# Patient Record
Sex: FEMALE | Race: AMERICAN INDIAN OR ALASKA NATIVE
[De-identification: names, ages, dates, MRNs, and addresses within clinical notes are randomized per-mention and may not be internally consistent; named-entity substitution may affect disease eponyms.]

---

## 2021-07-08 ENCOUNTER — HOSPITAL ENCOUNTER (OUTPATIENT)
Dept: HOSPITAL 5 - ED | Age: 56
Setting detail: OBSERVATION
LOS: 1 days | Discharge: HOME HEALTH | End: 2021-07-09
Attending: INTERNAL MEDICINE | Admitting: INTERNAL MEDICINE
Payer: MEDICAID

## 2021-07-08 DIAGNOSIS — E78.5: ICD-10-CM

## 2021-07-08 DIAGNOSIS — Z79.82: ICD-10-CM

## 2021-07-08 DIAGNOSIS — F17.213: ICD-10-CM

## 2021-07-08 DIAGNOSIS — F10.129: ICD-10-CM

## 2021-07-08 DIAGNOSIS — Z79.899: ICD-10-CM

## 2021-07-08 DIAGNOSIS — Z91.19: ICD-10-CM

## 2021-07-08 DIAGNOSIS — J96.10: Primary | ICD-10-CM

## 2021-07-08 DIAGNOSIS — I42.9: ICD-10-CM

## 2021-07-08 DIAGNOSIS — Z98.891: ICD-10-CM

## 2021-07-08 DIAGNOSIS — J44.9: ICD-10-CM

## 2021-07-08 DIAGNOSIS — I50.23: ICD-10-CM

## 2021-07-08 DIAGNOSIS — Z98.890: ICD-10-CM

## 2021-07-08 DIAGNOSIS — I11.0: ICD-10-CM

## 2021-07-08 LAB
ALBUMIN SERPL-MCNC: 3.9 G/DL (ref 3.9–5)
ALT SERPL-CCNC: 18 UNITS/L (ref 7–56)
BASOPHILS # (AUTO): 0 K/MM3 (ref 0–0.1)
BASOPHILS NFR BLD AUTO: 0.6 % (ref 0–1.8)
BUN SERPL-MCNC: 14 MG/DL (ref 7–17)
BUN/CREAT SERPL: 14 %
CALCIUM SERPL-MCNC: 8.7 MG/DL (ref 8.4–10.2)
EOSINOPHIL # BLD AUTO: 0.1 K/MM3 (ref 0–0.4)
EOSINOPHIL NFR BLD AUTO: 1.8 % (ref 0–4.3)
HCT VFR BLD CALC: 41.2 % (ref 30.3–42.9)
HEMOLYSIS INDEX: 24
HGB BLD-MCNC: 14 GM/DL (ref 10.1–14.3)
INR PPP: 0.95 (ref 0.87–1.13)
LYMPHOCYTES # BLD AUTO: 2.3 K/MM3 (ref 1.2–5.4)
LYMPHOCYTES NFR BLD AUTO: 39.4 % (ref 13.4–35)
MCHC RBC AUTO-ENTMCNC: 34 % (ref 30–34)
MCV RBC AUTO: 96 FL (ref 79–97)
MONOCYTES # (AUTO): 0.5 K/MM3 (ref 0–0.8)
MONOCYTES % (AUTO): 8.2 % (ref 0–7.3)
PLATELET # BLD: 267 K/MM3 (ref 140–440)
RBC # BLD AUTO: 4.29 M/MM3 (ref 3.65–5.03)

## 2021-07-08 PROCEDURE — 36415 COLL VENOUS BLD VENIPUNCTURE: CPT

## 2021-07-08 PROCEDURE — 96376 TX/PRO/DX INJ SAME DRUG ADON: CPT

## 2021-07-08 PROCEDURE — 94640 AIRWAY INHALATION TREATMENT: CPT

## 2021-07-08 PROCEDURE — 99285 EMERGENCY DEPT VISIT HI MDM: CPT

## 2021-07-08 PROCEDURE — 93005 ELECTROCARDIOGRAM TRACING: CPT

## 2021-07-08 PROCEDURE — 96375 TX/PRO/DX INJ NEW DRUG ADDON: CPT

## 2021-07-08 PROCEDURE — 96374 THER/PROPH/DIAG INJ IV PUSH: CPT

## 2021-07-08 PROCEDURE — 71275 CT ANGIOGRAPHY CHEST: CPT

## 2021-07-08 PROCEDURE — 94644 CONT INHLJ TX 1ST HOUR: CPT

## 2021-07-08 PROCEDURE — 99406 BEHAV CHNG SMOKING 3-10 MIN: CPT

## 2021-07-08 PROCEDURE — 71046 X-RAY EXAM CHEST 2 VIEWS: CPT

## 2021-07-08 PROCEDURE — G0378 HOSPITAL OBSERVATION PER HR: HCPCS

## 2021-07-08 PROCEDURE — 83880 ASSAY OF NATRIURETIC PEPTIDE: CPT

## 2021-07-08 PROCEDURE — 85025 COMPLETE CBC W/AUTO DIFF WBC: CPT

## 2021-07-08 PROCEDURE — 84484 ASSAY OF TROPONIN QUANT: CPT

## 2021-07-08 PROCEDURE — 85610 PROTHROMBIN TIME: CPT

## 2021-07-08 PROCEDURE — 80048 BASIC METABOLIC PNL TOTAL CA: CPT

## 2021-07-08 PROCEDURE — 85379 FIBRIN DEGRADATION QUANT: CPT

## 2021-07-08 PROCEDURE — 80053 COMPREHEN METABOLIC PANEL: CPT

## 2021-07-08 PROCEDURE — 93306 TTE W/DOPPLER COMPLETE: CPT

## 2021-07-08 RX ADMIN — Medication SCH ML: at 22:24

## 2021-07-08 RX ADMIN — FUROSEMIDE SCH: 10 INJECTION, SOLUTION INTRAVENOUS at 17:37

## 2021-07-08 NOTE — EMERGENCY DEPARTMENT REPORT
ED Chest Pain HPI





- General


Chief Complaint: Chest Pain


Stated Complaint: CHEST PAIN/ SHORTNESS OF BREATH


Time Seen by Provider: 07/08/21 12:45


Source: patient


Mode of arrival: Wheelchair


Limitations: No Limitations





- History of Present Illness


Initial Comments: 





This is a 56-year-old female presents to the emergency department with a 

complaint of a 1 day history of some generalized chest discomfort and shortness 

of breath.  It is associated with some wheezing and a mixed dry and productive 

cough.  The patient's chest discomfort increases with her respirations.  She 

denies any lower extremity swelling, fever, back pain, abdominal pain.  She has 

a past medical history of COPD for which she is supposed to be on home O2 but 

does not have the equipment.  She has a history of CHF.  Patient says that she 

has a primary care physician, cardiologist, and pulmonologist, back in [place] 

but she recently [place] out [place] [facility].  No recent travel or sick 

contacts at home.  She is a tobacco smoker but denies any illicit drug use.





- Related Data


                                  Previous Rx's











 Medication  Instructions  Recorded  Last Taken  Type


 


ALBUTEROL NEB's [Proventil 0.083% 2.5 mg IH Q4HRT PRN #30 nebu 07/09/21 Unknown 

Rx





NEBS]    


 


Albuterol Mdi (or & Nicu Only) 2 puff IH QID PRN #8.5 gram 07/09/21 Unknown Rx





[ProAir HFA Inhaler]    


 


Aspirin EC [Halfprin EC] 81 mg PO QDAY #30 tablet. 07/09/21 Unknown Rx


 


Folic Acid [Folvite] 1 mg PO QDAY #30 tablet 07/09/21 Unknown Rx


 


Pravastatin Sodium [Pravastatin] 10 mg PO QHS #30 tablet 07/09/21 Unknown Rx


 


carvediloL [Coreg] 6.25 mg PO BID #60 tablet 07/09/21 Unknown Rx


 


levoFLOXacin [Levaquin TAB] 750 mg PO Q24HR #5 tablet 07/09/21 Unknown Rx


 


lisinopriL [Zestril TAB] 2.5 mg PO QDAY #30 tablet 07/09/21 Unknown Rx











                                    Allergies











Allergy/AdvReac Type Severity Reaction Status Date / Time


 


No Known Allergies Allergy   Unverified 07/08/21 10:37














Heart Score





- HEART Score


History: Slightly suspicious


EKG: Non-specific


Age: 45-65


Risk factors: 1-2 risk factors


Troponin: < normal limit


HEART Score: 3





- EKG Read Time


Time EKG Completed: 10:44


EKG Read Time: 10:45





ED Review of Systems


ROS: 


Stated complaint: CHEST PAIN/ SHORTNESS OF BREATH


Other details as noted in HPI





Comment: All other systems reviewed and negative


Constitutional: denies: chills, fever


Eyes: denies: eye pain, vision change


ENT: denies: ear pain, throat pain


Respiratory: cough, shortness of breath, wheezing


Cardiovascular: chest pain.  denies: edema


Gastrointestinal: denies: abdominal pain, vomiting


Genitourinary: denies: dysuria, discharge


Musculoskeletal: denies: back pain, arthralgia


Skin: denies: rash, lesions


Neurological: denies: headache, weakness





ED Past Medical Hx





- Past Medical History


Hx Congestive Heart Failure: Yes


Hx COPD: Yes


Additional medical history: RAPID HEART BEAT/ EMPHYSEMA





- Surgical History


Additional Surgical History: RIGHT BREST/ C SECTION





- Social History


Smoking Status: Current Some Day Smoker


Substance Use Type: Alcohol





- Medications


Home Medications: 


                                Home Medications











 Medication  Instructions  Recorded  Confirmed  Last Taken  Type


 


ALBUTEROL NEB's [Proventil 0.083% 2.5 mg IH Q4HRT PRN #30 nebu 07/09/21  Unknown

 Rx





NEBS]     


 


Albuterol Mdi (or & Nicu Only) 2 puff IH QID PRN #8.5 gram 07/09/21  Unknown Rx





[ProAir HFA Inhaler]     


 


Aspirin EC [Halfprin EC] 81 mg PO QDAY #30 tablet. 07/09/21  Unknown Rx


 


Folic Acid [Folvite] 1 mg PO QDAY #30 tablet 07/09/21  Unknown Rx


 


Pravastatin Sodium [Pravastatin] 10 mg PO QHS #30 tablet 07/09/21  Unknown Rx


 


carvediloL [Coreg] 6.25 mg PO BID #60 tablet 07/09/21  Unknown Rx


 


levoFLOXacin [Levaquin TAB] 750 mg PO Q24HR #5 tablet 07/09/21  Unknown Rx


 


lisinopriL [Zestril TAB] 2.5 mg PO QDAY #30 tablet 07/09/21  Unknown Rx














ED Physical Exam





- General


Limitations: No Limitations





- Other


Other exam information: 





GENERAL: The patient is well-developed well-nourished.


HENT: Normocephalic.  Atraumatic.    Patient has moist mucous membranes.


EYES: Extraocular motions are intact. 


NECK: Supple. Trachea is midline.


CHEST/LUNGS: Coarse breath sounds throughout the chest.  A productive sounding 

cough is heard.  Tachypnea but no accessory muscle use.  Chest pain is not 

reproducible to palpation of the chest wall, no crepitus or deformity. 


HEART/CARDIOVASCULAR: Regular.  There is no tachycardia.  There is no murmur.


ABDOMEN: Abdomen is soft, nontender.  Patient has normal bowel sounds.  There is

no abdominal distention.


SKIN: Skin is warm and dry. 


NEURO: The patient is awake, alert, and oriented.  The patient is cooperative.  

The patient has no focal neurologic deficits.  Normal speech.


MUSCULOSKELETAL: There is no tenderness or deformity.  There is no limitation 

range of motion. 





ED Course


                                   Vital Signs











  07/08/21 07/08/21 07/08/21





  10:49 12:57 13:00


 


Temperature 98.1 F  


 


Pulse Rate 86  82


 


Respiratory 20  16





Rate   


 


Blood Pressure 136/63  136/77


 


O2 Sat by Pulse 95 98 97





Oximetry   














  07/08/21 07/08/21 07/08/21





  13:16 13:30 13:46


 


Temperature   


 


Pulse Rate 100 H 82 82


 


Respiratory 16 16 13





Rate   


 


Blood Pressure 129/72 108/62 131/79


 


O2 Sat by Pulse 95 97 97





Oximetry   














  07/08/21 07/08/21 07/08/21





  14:00 14:16 14:30


 


Temperature   


 


Pulse Rate 89 102 H 89


 


Respiratory 14 18 16





Rate   


 


Blood Pressure 128/80 126/82 124/81


 


O2 Sat by Pulse 100 98 97





Oximetry   














  07/08/21 07/08/21 07/08/21





  14:46 15:00 15:16


 


Temperature   


 


Pulse Rate 94 H 96 H 65


 


Respiratory 18 14 19





Rate   


 


Blood Pressure 127/84 135/92 139/92


 


O2 Sat by Pulse 97 98 95





Oximetry   














  07/08/21 07/08/21 07/08/21





  15:30 16:18 16:30


 


Temperature   


 


Pulse Rate 101 H  105 H


 


Respiratory 24  18





Rate   


 


Blood Pressure 135/92 135/92 135/92


 


O2 Sat by Pulse 96 97 98





Oximetry   














  07/08/21 07/08/21 07/08/21





  16:46 18:35 18:46


 


Temperature   


 


Pulse Rate 105 H  94 H


 


Respiratory 18 21 17





Rate   


 


Blood Pressure 140/94 138/89 131/82


 


O2 Sat by Pulse 94 99 98





Oximetry   














  07/08/21 07/08/21 07/08/21





  19:00 19:16 19:30


 


Temperature   


 


Pulse Rate 85 89 80


 


Respiratory 16 14 16





Rate   


 


Blood Pressure 145/80 142/78 137/83


 


O2 Sat by Pulse 98 97 99





Oximetry   














  07/08/21 07/08/21 07/08/21





  19:45 19:46 20:00


 


Temperature   


 


Pulse Rate  105 H 105 H


 


Respiratory 20 21 20





Rate   


 


Blood Pressure  152/90 152/90


 


O2 Sat by Pulse 97 98 86





Oximetry   














  07/08/21 07/08/21 07/08/21





  20:16 20:30 20:46


 


Temperature   


 


Pulse Rate 102 H 100 H 105 H


 


Respiratory 18 16 23





Rate   


 


Blood Pressure 164/92 169/117 169/97


 


O2 Sat by Pulse 98 97 98





Oximetry   














  07/08/21 07/08/21





  21:00 21:16


 


Temperature  


 


Pulse Rate 107 H 78


 


Respiratory 17 25 H





Rate  


 


Blood Pressure 152/89 147/95


 


O2 Sat by Pulse 96 99





Oximetry  














RICARDO score





- Ricardo Score


Age > 65: (0) No


Aspirin use within the Past 7 Days: (0) No


3 or more CAD Risk Factors: (0) No


2 or more Angina events in past 24 hrs: (1) Yes


Known CAD with more than 50% Stenosis: (0) No


Elevated Cardiac Markers: (0) No


ST Deviation Greater than 0.5mm: (0) No


RICARDO Score: 1





ED Medical Decision Making





- Lab Data


Result diagrams: 


                                 07/08/21 12:33





                                 07/09/21 07:59








                                   Lab Results











  07/08/21 07/08/21 07/08/21 Range/Units





  12:33 12:33 13:00 


 


WBC  5.9    (4.5-11.0)  K/mm3


 


RBC  4.29    (3.65-5.03)  M/mm3


 


Hgb  14.0    (10.1-14.3)  gm/dl


 


Hct  41.2    (30.3-42.9)  %


 


MCV  96    (79-97)  fl


 


MCH  33 H    (28-32)  pg


 


MCHC  34    (30-34)  %


 


RDW  13.1 L    (13.2-15.2)  %


 


Plt Count  267    (140-440)  K/mm3


 


Lymph % (Auto)  39.4 H    (13.4-35.0)  %


 


Mono % (Auto)  8.2 H    (0.0-7.3)  %


 


Eos % (Auto)  1.8    (0.0-4.3)  %


 


Baso % (Auto)  0.6    (0.0-1.8)  %


 


Lymph # (Auto)  2.3    (1.2-5.4)  K/mm3


 


Mono # (Auto)  0.5    (0.0-0.8)  K/mm3


 


Eos # (Auto)  0.1    (0.0-0.4)  K/mm3


 


Baso # (Auto)  0.0    (0.0-0.1)  K/mm3


 


Seg Neutrophils %  50.0    (40.0-70.0)  %


 


Seg Neutrophils #  2.9    (1.8-7.7)  K/mm3


 


PT     (12.2-14.9)  Sec.


 


INR     (0.87-1.13)  


 


D-Dimer     (0-234)  ng/mlDDU


 


Sodium   139   (137-145)  mmol/L


 


Potassium   3.8   (3.6-5.0)  mmol/L


 


Chloride   102.1   ()  mmol/L


 


Carbon Dioxide   26   (22-30)  mmol/L


 


Anion Gap   15   mmol/L


 


BUN   14   (7-17)  mg/dL


 


Creatinine   1.0   (0.6-1.2)  mg/dL


 


Estimated GFR   57   ml/min


 


BUN/Creatinine Ratio   14   %


 


Glucose   90   ()  mg/dL


 


Calcium   8.7   (8.4-10.2)  mg/dL


 


Total Bilirubin   0.30   (0.1-1.2)  mg/dL


 


AST   24   (5-40)  units/L


 


ALT   18   (7-56)  units/L


 


Alkaline Phosphatase   77   ()  units/L


 


Troponin T   < 0.010  < 0.010  (0.00-0.029)  ng/mL


 


NT-Pro-B Natriuret Pep     (0-900)  pg/mL


 


Total Protein   6.9   (6.3-8.2)  g/dL


 


Albumin   3.9   (3.9-5)  g/dL


 


Albumin/Globulin Ratio   1.3   %














  07/08/21 07/08/21 07/08/21 Range/Units





  13:00 13:00 13:00 


 


WBC     (4.5-11.0)  K/mm3


 


RBC     (3.65-5.03)  M/mm3


 


Hgb     (10.1-14.3)  gm/dl


 


Hct     (30.3-42.9)  %


 


MCV     (79-97)  fl


 


MCH     (28-32)  pg


 


MCHC     (30-34)  %


 


RDW     (13.2-15.2)  %


 


Plt Count     (140-440)  K/mm3


 


Lymph % (Auto)     (13.4-35.0)  %


 


Mono % (Auto)     (0.0-7.3)  %


 


Eos % (Auto)     (0.0-4.3)  %


 


Baso % (Auto)     (0.0-1.8)  %


 


Lymph # (Auto)     (1.2-5.4)  K/mm3


 


Mono # (Auto)     (0.0-0.8)  K/mm3


 


Eos # (Auto)     (0.0-0.4)  K/mm3


 


Baso # (Auto)     (0.0-0.1)  K/mm3


 


Seg Neutrophils %     (40.0-70.0)  %


 


Seg Neutrophils #     (1.8-7.7)  K/mm3


 


PT  13.3    (12.2-14.9)  Sec.


 


INR  0.95    (0.87-1.13)  


 


D-Dimer    256.97 H  (0-234)  ng/mlDDU


 


Sodium     (137-145)  mmol/L


 


Potassium     (3.6-5.0)  mmol/L


 


Chloride     ()  mmol/L


 


Carbon Dioxide     (22-30)  mmol/L


 


Anion Gap     mmol/L


 


BUN     (7-17)  mg/dL


 


Creatinine     (0.6-1.2)  mg/dL


 


Estimated GFR     ml/min


 


BUN/Creatinine Ratio     %


 


Glucose     ()  mg/dL


 


Calcium     (8.4-10.2)  mg/dL


 


Total Bilirubin     (0.1-1.2)  mg/dL


 


AST     (5-40)  units/L


 


ALT     (7-56)  units/L


 


Alkaline Phosphatase     ()  units/L


 


Troponin T     (0.00-0.029)  ng/mL


 


NT-Pro-B Natriuret Pep   3162 H   (0-900)  pg/mL


 


Total Protein     (6.3-8.2)  g/dL


 


Albumin     (3.9-5)  g/dL


 


Albumin/Globulin Ratio     %














  07/08/21 Range/Units





  16:40 


 


WBC   (4.5-11.0)  K/mm3


 


RBC   (3.65-5.03)  M/mm3


 


Hgb   (10.1-14.3)  gm/dl


 


Hct   (30.3-42.9)  %


 


MCV   (79-97)  fl


 


MCH   (28-32)  pg


 


MCHC   (30-34)  %


 


RDW   (13.2-15.2)  %


 


Plt Count   (140-440)  K/mm3


 


Lymph % (Auto)   (13.4-35.0)  %


 


Mono % (Auto)   (0.0-7.3)  %


 


Eos % (Auto)   (0.0-4.3)  %


 


Baso % (Auto)   (0.0-1.8)  %


 


Lymph # (Auto)   (1.2-5.4)  K/mm3


 


Mono # (Auto)   (0.0-0.8)  K/mm3


 


Eos # (Auto)   (0.0-0.4)  K/mm3


 


Baso # (Auto)   (0.0-0.1)  K/mm3


 


Seg Neutrophils %   (40.0-70.0)  %


 


Seg Neutrophils #   (1.8-7.7)  K/mm3


 


PT   (12.2-14.9)  Sec.


 


INR   (0.87-1.13)  


 


D-Dimer   (0-234)  ng/mlDDU


 


Sodium   (137-145)  mmol/L


 


Potassium   (3.6-5.0)  mmol/L


 


Chloride   ()  mmol/L


 


Carbon Dioxide   (22-30)  mmol/L


 


Anion Gap   mmol/L


 


BUN   (7-17)  mg/dL


 


Creatinine   (0.6-1.2)  mg/dL


 


Estimated GFR   ml/min


 


BUN/Creatinine Ratio   %


 


Glucose   ()  mg/dL


 


Calcium   (8.4-10.2)  mg/dL


 


Total Bilirubin   (0.1-1.2)  mg/dL


 


AST   (5-40)  units/L


 


ALT   (7-56)  units/L


 


Alkaline Phosphatase   ()  units/L


 


Troponin T  < 0.010  (0.00-0.029)  ng/mL


 


NT-Pro-B Natriuret Pep   (0-900)  pg/mL


 


Total Protein   (6.3-8.2)  g/dL


 


Albumin   (3.9-5)  g/dL


 


Albumin/Globulin Ratio   %














- EKG Data


-: EKG Interpreted by Me


EKG shows normal: sinus rhythm, axis, intervals, QRS complexes (LVH), ST-T waves

(Nonspecific ST T waves)


Rate: normal





- EKG Data


When compared to previous EKG there are: previous EKG unavailable


Interpretation: other (Sinus rhythm, normal axis, normal intervals, LVH, 

nonspecific ST T waves.  No ST elevation MI.)





- Radiology Data


Radiology results: report reviewed





CHEST 2 VIEWS 1101 INDICATION / CLINICAL INFORMATION: CP/SOB COMPARISON: None 

available. FINDINGS: SUPPORT DEVICES: None. HEART / MEDIASTINUM: Mild 

cardiomegaly LUNGS / PLEURA: Pulmonary vascularity appears within normal limits.

No definite pleural effusions are seen. Diffuse bilateral moderately prominent 

increased interstitial markings are seen. These are noted throughout both lung 

fields though are most noticeable in the right base and particularly the right 

middle lobe. Probably these markings are predominantly chronic based on 

appearance though certainly could include interstitial edema/infiltrate and I am

concerned there could be superimposed focal pneumonitis in the right middle lobe

though without dense consolidation. No pneumothorax. ADDITIONAL FINDINGS: No 

significant additional findings. IMPRESSION: Diffuse interstitial markings as 

above, more concentrated in the right middle lobe as discussed. Chronicity is 

unclear but acute pneumonitis as well as mild edema are possible. In the absence

of prior studies for comparison, follow-up is suggested as is clinical 

correlation. 





CTA CHEST WITH CONTRAST INDICATION / CLINICAL INFORMATION: CP, SOB, Elevated 

dimer 100 ml omni 350. TECHNIQUE: Axial CT images were obtained through the 

chest after injection of 100 cc of Omnipaque 350 IV contrast. 3 plane MIP and/or

3D reconstructions were produced. All CT scans at this location are performed 

using CT dose reduction for ALARA by means of automated exposure control. COM

PARISON: None available. FINDINGS: PULMONARY ARTERIES: No pulmonary emboli. 

THORACIC AORTA: Mild atherosclerotic calcification without acute abnormality. 

HEART: Mildly enlarged CORONARY ARTERY CALCIFICATION: None. MEDIASTINUM / KAIA: 

No significant abnormality. PLEURA: There is a small right pleural effusion No 

pneumothorax. LUNGS: There are scattered bullous disease. There is thickening of

interlobular septa. There is peripheral interstitial disease bilaterally. There 

are some very small nodules in the periphery of the upper lobes measuring up to 

4 mm. ADDITIONAL FINDINGS: None. UPPER ABDOMEN: There are small hypodensities in

the liver characteristic of cysts. SKELETAL STRUCTURES: No significant osseous 

abnormality. IMPRESSION: 1. No CT evidence for pulmonary embolism. 2. There are 

bilateral interstitial opacities may be chronic or may represent edema. The 

possibility of an interstitial pneumonitis as well as infectious process is 

considered in the differential. There are multiple very small nodular densities 

in the periphery of the lungs measuring up to 4 mm. 





- Medical Decision Making





This patient presents to the emergency department with complaint of some 

generalized chest pain that worsens with deep respirations.  She has both 

wheezing and coarse breath sounds heard.  There is some mild tachypnea but no 

accessory muscle use.





EKG does not have any morphology consistent with ST elevation myocardial 

infarction.





Chest x-ray shows some interstitial markings that appear, to me, to be 

consistent with some interstitial edema.  No widened mediastinum.  No obvious 

pneumonia.





Patient was given breathing treatments, Solu-Medrol, Lasix.





Patient's labs were concerning for an elevated proBNP of about 3000, and a 

slightly elevated and equivocal D-dimer level.  The patient had a CT angiography

of the chest that once again showed these interstitial markings that could be 

edema versus some pneumonitis.  Patient continues to have the symptoms despite 

the aforementioned treatment.  She has a heart score of 3.  Patient will be 

admitted to the hospital for further evaluation and treatment was accepted for 

admission by the hospitalist, Dr. Rowley.


Critical Care Time: No


Critical care attestation.: 


If time is entered above; I have spent that time in minutes in the direct care 

of this critically ill patient, excluding procedure time.








ED Disposition


Clinical Impression: 


 Acute chest pain, Tobacco use disorder





CHF exacerbation


Qualifiers:


 Heart failure type: systolic Qualified Code(s): I50.23 - Acute on chronic 

systolic (congestive) heart failure





COPD (chronic obstructive pulmonary disease)


Qualifiers:


 COPD type: chronic bronchitis Chronic bronchitis type: unspecified Qualified 

Code(s): J42 - Unspecified chronic bronchitis





Disposition: DC-09 OP ADMIT IP TO THIS HOSP


Is pt being admited?: Yes


Condition: Fair


Time of Disposition: 16:58

## 2021-07-08 NOTE — XRAY REPORT
CHEST 2 VIEWS 1101



INDICATION / CLINICAL INFORMATION: CP/SOB



COMPARISON: None available.



FINDINGS:



SUPPORT DEVICES: None.



HEART / MEDIASTINUM: Mild cardiomegaly 



LUNGS / PLEURA: Pulmonary vascularity appears within normal limits. No definite pleural effusions are
 seen. Diffuse bilateral moderately prominent increased interstitial markings are seen. These are not
ed throughout both lung fields though are most noticeable in the right base and particularly the righ
t middle lobe. Probably these markings are predominantly chronic based on appearance though certainly
 could include interstitial edema/infiltrate and I am concerned there could be superimposed focal pne
umonitis in the right middle lobe though without dense consolidation. No pneumothorax. 



ADDITIONAL FINDINGS: No significant additional findings.



IMPRESSION: Diffuse interstitial markings as above, more concentrated in the right middle lobe as dis
cussed. Chronicity is unclear but acute pneumonitis as well as mild edema are possible. In the absenc
e of prior studies for comparison, follow-up is suggested as is clinical correlation.



Signer Name: Fabian Merritt MD 

Signed: 7/8/2021 11:53 AM

Workstation Name: [id number]

## 2021-07-08 NOTE — HISTORY AND PHYSICAL REPORT
History of Present Illness


Chief complaint: 





I cant breathe


History of present illness: 


55 YO Female with Nicotine Dependence, CHF, Medication Noncompliance, COPD, ETOH

Dependence, Chronic Respiratory Failure noncompliant with home oxygen presents 

to ED for evaluation.  Patient reports "I cannot breathe".  Patient states that 

she has experienced breath, chest discomfort, orthopnea, paroxysmal nocturnal 

dyspnea, decreased exercise tolerance, dyspnea on exertion, and dyspnea at rest,

chest discomfort over the past 1 week with worsening symptoms over the past 1 

day.  Patient transported to [facility] via private vehicle for further care and 

evaluation of the aforementioned symptoms.  The patient was seen and evaluated 

in the emergency department.  All lab and imaging studies reviewed.  Patient 

with chest x-ray and found to have pulmonary vascular congestion as well as 

clinical symptoms consistent with CHF decompensation.  Patient placed in 

observation status and admitted to telemetry and initiated on CHF protocol.  

Patient denies fever, chills, chest pain, palpitation, productive cough, skin 

rash, recent ill contacts, known exposure to COVID-19.  No prior admission for 

review.  All medication listed at time of admission has been reconciled.  

Cardiology team consulted in ED.





Past History


Past Medical History: heart failure, hypertension, other (See HPI)


Past Surgical History: , Other (Right breast surgery)


Social history: single, smoking.  denies: alcohol abuse, prescription drug abuse


Family history: diabetes, hypertension





Medications and Allergies


                                    Allergies











Allergy/AdvReac Type Severity Reaction Status Date / Time


 


No Known Allergies Allergy   Unverified 21 10:37














Review of Systems


Constitutional: no weight loss, no weight gain, no fever, no chills


Ears, nose, mouth and throat: no ear pain, no ear discharge, no tinnitis, no 

nose pain, no nasal congestion, no nasal discharge


Breasts: no change in shape, no swelling, no mass


Cardiovascular: orthopnea, edema, shortness of breath, dyspnea on exertion, paro

xysmal nocturnal dyspnea, leg edema, decreased exercise tolerance, no chest 

pain, no palpitations, no rapid/irregular heart beat


Respiratory: no cough, no cough with sputum, no hemoptysis


Gastrointestinal: no abdominal pain, no nausea, no vomiting, no diarrhea


Genitourinary Female: no pelvic pain, no flank pain, no dysuria, no urinary 

frequency, no urgency


Rectal: no pain, no incontinence, no bleeding


Musculoskeletal: no neck stiffness, no neck pain, no shooting arm pain, no arm 

numbness/tingling, no low back pain


Integumentary: no rash, no pruritis, no redness, no sores, no wounds


Neurological: no transient paralysis, no paralysis, no weakness, no parathesias,

no numbness, no tingling, no seizures


Psychiatric: no anxiety, no memory loss, no change in sleep habits, no sleep 

disturbances, no insomnia, no hypersomnia, no change in appetite, no change in 

libido


Endocrine: no cold intolerance, no heat intolerance, no polyphagia, no 

polydipsia, no polyuria, no nocturia, no excessive sweating


Hematologic/Lymphatic: no easy bruising, no easy bleeding, no lymphadenopathy





Exam





- Constitutional


Vitals: 


                                        











Temp Pulse Resp BP Pulse Ox


 


 98.1 F   82   13   131/79   97 


 


 21 10:49  21 13:46  21 13:46  21 13:46  21 13:46











General appearance: Present: mild distress





- EENT


Eyes: Present: PERRL


ENT: hearing intact, clear oral mucosa





- Neck


Neck: Present: supple, normal ROM





- Respiratory


Respiratory effort: normal


Respiratory: bilateral: diminished, rales





- Cardiovascular


Heart Sounds: Present: S1 & S2.  Absent: rub, click





- Extremities


Extremities: pulses symmetrical


Extremity abnormal: edema


Peripheral Pulses: within normal limits





- Abdominal


General gastrointestinal: Present: soft, non-tender, non-distended, normal bowel

sounds


Female genitourinary: Present: normal





- Integumentary


Integumentary: Present: clear, warm, dry





- Musculoskeletal


Musculoskeletal: gait normal, strength equal bilaterally





- Psychiatric


Psychiatric: appropriate mood/affect, intact judgment & insight





- Neurologic


Neurologic: CNII-XII intact, moves all extremities





HEART Score





- HEART Score


EKG: Non-specific


Age: 45-65


Risk factors: 1-2 risk factors


Troponin: 


                                        











Troponin T  < 0.010 ng/mL (0.00-0.029)   21  13:00    











Troponin: < normal limit





Results





- Labs


CBC & Chem 7: 


                                 21 12:33





                                 21 12:33


Labs: 


                              Abnormal lab results











  21 Range/Units





  12:33 13:00 13:00 


 


MCH  33 H    (28-32)  pg


 


RDW  13.1 L    (13.2-15.2)  %


 


Lymph % (Auto)  39.4 H    (13.4-35.0)  %


 


Mono % (Auto)  8.2 H    (0.0-7.3)  %


 


D-Dimer    256.97 H  (0-234)  ng/mlDDU


 


NT-Pro-B Natriuret Pep   3162 H   (0-900)  pg/mL














Assessment and Plan





- Patient Problems


(1) CHF exacerbation


Current Visit: Yes   Status: Acute   


Qualifiers: 


   Heart failure type: systolic   Qualified Code(s): I50.23 - Acute on chronic 

systolic (congestive) heart failure   


Plan to address problem: 


CHF protocol: Strict I/O, monitor urine output every shift, daily weight, aft

erload reduction, blood pressure control, echocardiogram ordered and pending at 

time of admission, cardiology team consulted.








(2) Chronic respiratory failure


Current Visit: Yes   Status: Acute   


Plan to address problem: 


Patient noncompliant with home oxygen.  Home oxygen evaluation, supportive care.








(3) Nicotine dependence


Current Visit: Yes   Status: Acute   


Qualifiers: 


   Nicotine product type: cigarettes   Substance use status: in withdrawal   

Qualified Code(s): F17.213 - Nicotine dependence, cigarettes, with withdrawal   


Plan to address problem: 


 smoking cessation counseling, supportive care, behavior change counseling, +15 

minutes








(4) Noncompliance


Current Visit: Yes   Status: Acute   


Plan to address problem: 


Patient counseled regarding medication noncompliance as well as noncompliance 

with home oxygen.  Patient knowledges understanding instructions and 

acknowledges that she will attempt to be more compliant in the future








(5) Alcohol dependence


Current Visit: Yes   Status: Acute   


Plan to address problem: 


Thiamine, folic acid, multivitamin daily, [facility] protocol.








(6) COPD (chronic obstructive pulmonary disease)


Current Visit: Yes   Status: Acute   


Qualifiers: 


   COPD type: chronic bronchitis 


Plan to address problem: 


Supplemental oxygen, pulse oximetry, nebulizer therapy, supportive care.








(7) DVT prophylaxis


Current Visit: Yes   Status: Acute   


Plan to address problem: 


SCD to bilateral lower extremities while in bed, patient is ambulatory.

## 2021-07-08 NOTE — CAT SCAN REPORT
CTA CHEST WITH CONTRAST



INDICATION / CLINICAL INFORMATION: CP, SOB, Elevated dimer  100 ml omni 350.



TECHNIQUE: Axial CT images were obtained through the chest after injection of 100 cc of Omnipaque 350
 IV contrast. 3 plane MIP and/or 3D reconstructions were produced. All CT scans at this location are 
performed using CT dose reduction for ALARA by means of automated exposure control. 



COMPARISON: None available.



FINDINGS:

PULMONARY ARTERIES: No pulmonary emboli.

THORACIC AORTA: Mild atherosclerotic calcification without acute abnormality. 

HEART: Mildly enlarged

CORONARY ARTERY CALCIFICATION: None.

MEDIASTINUM / KAIA: No significant abnormality.

PLEURA: There is a small right pleural effusion No pneumothorax.

LUNGS: There are scattered bullous disease. There is thickening of interlobular septa. There is perip
heral interstitial disease bilaterally. There are some very small nodules in the periphery of the upp
er lobes measuring up to 4 mm.



ADDITIONAL FINDINGS: None.



UPPER ABDOMEN: There are small hypodensities in the liver characteristic of cysts.



SKELETAL STRUCTURES: No significant osseous abnormality.



IMPRESSION:

1. No CT evidence for pulmonary embolism. 

2. There are bilateral interstitial opacities may be chronic or may represent edema. The possibility 
of an interstitial pneumonitis as well as infectious process is considered in the differential. There
 are multiple very small nodular densities in the periphery of the lungs measuring up to 4 mm.



Multiple incidental pulmonary nodule(s) in the upper lobes measuring 4 mm with solid characteristics.
 Recommendation according to Fleischner Society 2017 Guidelines: Low Risk Patient: No routine follow-
up; High Risk Patient: Optional CT at 12 months.



Signer Name: Devan Owens MD 

Signed: 7/8/2021 4:32 PM

Workstation Name: [employer]

## 2021-07-09 VITALS — SYSTOLIC BLOOD PRESSURE: 129 MMHG | DIASTOLIC BLOOD PRESSURE: 76 MMHG

## 2021-07-09 LAB
BUN SERPL-MCNC: 17 MG/DL (ref 7–17)
BUN/CREAT SERPL: 21 %
CALCIUM SERPL-MCNC: 9.7 MG/DL (ref 8.4–10.2)
HEMOLYSIS INDEX: 0

## 2021-07-09 RX ADMIN — FUROSEMIDE SCH MG: 10 INJECTION, SOLUTION INTRAVENOUS at 07:09

## 2021-07-09 RX ADMIN — ALBUTEROL SULFATE PRN MG: 2.5 SOLUTION RESPIRATORY (INHALATION) at 05:28

## 2021-07-09 RX ADMIN — Medication SCH ML: at 09:58

## 2021-07-09 RX ADMIN — ALBUTEROL SULFATE PRN MG: 2.5 SOLUTION RESPIRATORY (INHALATION) at 14:15

## 2021-07-09 NOTE — CONSULTATION
History of Present Illness


Consult date: 21


Requesting physician: SANDHYA VILLARREAL


Consult reason: congestive heart failure


History of present illness: 





This patient is a 56-year-old female with a significant history of 

cardiomyopathy, heart failure reduced ejection fraction, palpitations, 

hypertension, hyperlipidemia, COPD emphysema, tobacco use, heart murmur.  She is

previously unknown to our practice and has been followed by cardiology through 

[facility] [address] [facility].  Patient presents to [facility] ER 

with chief complaint of shortness of breath, chest pain x3 days.  Patient 

normally has no limitations on ADLs but over the last 3 days is having 

significant exertional dyspnea.  Patient has previously been prescribed LifeVest

approximately 5 years ago which she wore for 1 year before discontinuing AGAINST

MEDICAL ADVICE.  Patient has not followed up with recommended electrophysiology 

to discuss ICD.  She had cardiac stress test 3 years prior which she reports as 

normal.  She is on home medications Coreg, flecainide, lisinopril and until 

recently hydrochlorothiazide which was discontinued due to hypokalemia.  Tobacco

use is reported with his 2 packs/day x 40 years for estimated 80 pack years, 

EtOH 2 drinks per night, no recreational drugs.  At time of interview patient is

having mild chest discomfort 3 out of 10 achy with mild shortness of breath.  

She denies any weakness, dizziness, abdominal pain, N/V/D, recent illness or 

known exposures.  Patient denies any previous history of coronary artery 

disease, CVA, DVT, PE.  In the past she has been on home O2 for COPD but has 

been discontinued for several years due to economic barriers.  Patient also 

states she has recently moved from the [place] side of [place] to this area and 

would like to be reestablished with cardiology.








Past History


Past Medical History: heart failure, hypertension, other (See HPI)


Past Surgical History: , Other (Right breast surgery)


Social history: single, smoking.  denies: alcohol abuse, prescription drug abuse


Family history: diabetes, hypertension





Medications and Allergies


                                    Allergies











Allergy/AdvReac Type Severity Reaction Status Date / Time


 


No Known Allergies Allergy   Unverified 21 10:37











Active Meds: 


Active Medications





Acetaminophen (Acetaminophen 325 Mg Tab)  650 mg PO Q4H PRN


   PRN Reason: Pain MILD(1-3)/Fever >100.5/HA


Albuterol (Albuterol 2.5 Mg/3 Ml Nebu)  2.5 mg IH Q4HRT PRN


   PRN Reason: Shortness Of Breath


   Last Admin: 21 05:28 Dose:  2.5 mg


   Documented by: 


Folic Acid (Folic Acid 1 Mg Tab)  1 mg PO QDAY [facility]


   Last Admin: 21 09:58 Dose:  1 mg


   Documented by: 


Furosemide (Furosemide 20 Mg/2 Ml Inj)  20 mg IV BID@0600,1800 [facility]


   Last Admin: 21 07:09 Dose:  20 mg


   Documented by: 


Hydromorphone HCl (Hydromorphone 1 Mg/1 Ml Inj)  0.5 mg IV Q12H PRN


   PRN Reason: Pain , Severe (7-10)


Lorazepam (Lorazepam 2 Mg/Ml Vial)  2 mg IV Q1HR PRN


   PRN Reason: CIWA-Ar 8-15


Multivitamins (Multivitamins ,Therapeutic Tab)  1 each PO QDAY [facility]


   Last Admin: 21 09:58 Dose:  1 each


   Documented by: 


Ondansetron HCl (Ondansetron 4 Mg/2 Ml Inj)  4 mg IV Q8H PRN


   PRN Reason: Nausea And Vomiting


Oxycodone/Acetaminophen (Oxycodone /Acetaminophen 5-325mg Tab)  1 tab PO Q8H PRN


   PRN Reason: Pain, Moderate (4-6)


   Last Admin: 21 07:09 Dose:  1 tab


   Documented by: 


Sodium Chloride (Sodium Chloride 0.9% 10 Ml Flush Syringe)  10 ml IV BID [facility]


   Last Admin: 21 09:58 Dose:  10 ml


   Documented by: 


Sodium Chloride (Sodium Chloride 0.9% 10 Ml Flush Syringe)  10 ml IV PRN PRN


   PRN Reason: LINE FLUSH











Review of Systems


Constitutional: no weight loss, no weight gain, no fever, no chills, no sweats, 

no night sweats


Ears, nose, mouth and throat: no ear pain, no ear discharge, no nose pain, no 

nasal congestion, no nasal discharge


Cardiovascular: chest pain, shortness of breath, dyspnea on exertion, decreased 

exercise tolerance, no orthopnea, no palpitations, no rapid/irregular heart 

beat, no edema, no syncope, no lightheadedness, no paroxysmal nocturnal dyspnea,

no claudication, no high blood pressure, no leg edema


Respiratory: cough, dyspnea on exertion, no cough with sputum, no hemoptysis, no

shortness of breath


Gastrointestinal: no abdominal pain, no nausea, no vomiting, no diarrhea


Genitourinary Female: no flank pain


Musculoskeletal: no neck stiffness, no neck pain, no shooting arm pain, no arm 

numbness/tingling, no low back pain, no shooting leg pain


Integumentary: no rash, no pruritis, no redness, no sores, no wounds, no 

jaundice


Neurological: no head injury, no paralysis, no weakness, no parathesias, no 

numbness, no tingling, no seizures, no syncope


Psychiatric: no anxiety


Endocrine: no cold intolerance, no heat intolerance


Hematologic/Lymphatic: no easy bruising, no easy bleeding


Allergic/Immunologic: no urticaria





Physical Examination


                                        


                                Last Vital Signs











Temp  97.8 F   21 08:21


 


Pulse  89   21 08:21


 


Resp  18   21 08:21


 


BP  130/76   21 08:21


 


Pulse Ox  94   21 08:21














General appearance: no acute distress


HEENT: Positive: PERRL, Normocephaly, Mucus Membranes Moist


Neck: Positive: neck supple, trachea midline


Cardiac: Positive: Reg Rate and Rhythm, S1/S2


Lungs: Positive: Decreased Breath Sounds


Neuro: Positive: Grossly Intact


Abdomen: Positive: Unremarkable, Soft


Skin: Negative: Rash, Wound


Extremities: Present: upper extr. pulses, lower extr. pulses.  Absent: edema





Results





                                 21 12:33





                                 21 07:59


                                 Cardiac Enzymes











  21 Range/Units





  12:33 


 


AST  24  (5-40)  units/L








                                   Coagulation











  21 Range/Units





  13:00 


 


PT  13.3  (12.2-14.9)  Sec.


 


INR  0.95  (0.87-1.13)  








                                       CBC











  21 Range/Units





  12:33 


 


WBC  5.9  (4.5-11.0)  K/mm3


 


RBC  4.29  (3.65-5.03)  M/mm3


 


Hgb  14.0  (10.1-14.3)  gm/dl


 


Hct  41.2  (30.3-42.9)  %


 


Plt Count  267  (140-440)  K/mm3


 


Lymph # (Auto)  2.3  (1.2-5.4)  K/mm3


 


Mono # (Auto)  0.5  (0.0-0.8)  K/mm3


 


Eos # (Auto)  0.1  (0.0-0.4)  K/mm3


 


Baso # (Auto)  0.0  (0.0-0.1)  K/mm3








                          Comprehensive Metabolic Panel











  21 Range/Units





  12:33 07:59 


 


Sodium  139  142  (137-145)  mmol/L


 


Potassium  3.8  4.3  (3.6-5.0)  mmol/L


 


Chloride  102.1  101.4  ()  mmol/L


 


Carbon Dioxide  26  27  (22-30)  mmol/L


 


BUN  14  17  (7-17)  mg/dL


 


Creatinine  1.0  0.8  (0.6-1.2)  mg/dL


 


Glucose  90  115 H  ()  mg/dL


 


Calcium  8.7  9.7  (8.4-10.2)  mg/dL


 


AST  24   (5-40)  units/L


 


ALT  18   (7-56)  units/L


 


Alkaline Phosphatase  77   ()  units/L


 


Total Protein  6.9   (6.3-8.2)  g/dL


 


Albumin  3.9   (3.9-5)  g/dL














- Imaging and Cardiology


Echo: report reviewed (Echocardiogram (2021): LVEF is 30 to 35%.  LV mildly 

dilated.  LV SF moderately decreased.  Moderate global hypokinesis of LV.  

Severe diastolic dysfunction restrictive filling.  RV SF is normal.  LA is 

mildly dilated.  Mild MR.  Mild TR.)


EKG: report reviewed (Twelve-lead reviewed shows sinus rhythm with no acute 

ischemic changes), image reviewed





EKG interpretations





- Telemetry


EKG Rhythm: Sinus Rhythm





- EKG


Sinus rhythms and dysrhythmias: sinus rhythm





Assessment and Plan





Acute respiratory distress secondary to pneumonia


* CXR reviewed: Suspicious for right middle lobe pneumonia





Chest pain


* Chest pain is currently improved.  Patient describes mild discomfort with some

  shortness of breath.  Twelve-lead shows sinus rhythm with no acute ischemic c

  hanges.  Troponins are negative x2.  We will continue to trend CE's.


* Patient reports negative stress test 3 years prior through [facility].  Will request records.





Heart failure reduced ejection fraction in setting of restrictive cardiomyopathy


* Echocardiogram (2021): LVEF is 30 to 35%.  LV mildly dilated.  LV SF 

  moderately decreased.  Moderate global hypokinesis of LV.  Severe diastolic 

  dysfunction restrictive filling.  RV SF is normal.  LA is mildly dilated.  

  Mild MR.  Mild TR.


* Review of telemetry shows sinus rhythm with 10 beat episode of V. tach.  

  Patient has previously been prescribed LifeVest but has abandoned treatment 

  AGAINST MEDICAL ADVICE because she states people were making fun of her.  She 

  has not followed up with electrophysiology to discuss ICD.


* GDMT: Initiate ASA 81, Coreg, lisinopril, statin.





Tobacco use


* Cessation encouraged





Patient is in stable cardiac status.  We will plan for outpatient ischemic eval.

 Patient may be discharged from cardiology standpoint on GDMT.  Will follow


Patient should follow-up with Dr Villagran, [facility] heart specialist within

1 to 2 weeks of discharge (pending scheduling).  #[phone]


This patient was seen in conjunction with Dr Villagran who agrees with this 

assessment plan of care








- Patient Problems


(1) Hypertension


Current Visit: Yes   Status: Acute   





(2) Hyperlipidemia


Current Visit: Yes   Status: Acute   





(3) Cardiomyopathy


Current Visit: Yes   Status: Acute   





(4) Heart failure with reduced ejection fraction


Current Visit: Yes   Status: Acute   





(5) Acute chest pain


Current Visit: Yes   Status: Acute   





(6) Alcohol dependence


Current Visit: Yes   Status: Acute   





(7) COPD (chronic obstructive pulmonary disease)


Current Visit: Yes   Status: Acute   


Qualifiers: 


   COPD type: chronic bronchitis 





(8) Chronic respiratory failure


Current Visit: Yes   Status: Acute   





(9) DVT prophylaxis


Current Visit: Yes   Status: Acute   





(10) Noncompliance


Current Visit: Yes   Status: Acute   





(11) Tobacco use disorder


Current Visit: Yes   Status: Acute

## 2021-07-09 NOTE — ELECTROCARDIOGRAPH REPORT
[facility]

                                       

Test Date:    2021               Test Time:    10:44:46

Pat Name:     SHERIF HERNANDEZ             Department:   

Patient ID:   [id number]          Room:         A453

Gender:       F                        Technician:   AMBER

:          1965               Requested By: ED DOC

Order Number: [id number]              Reading MD:   Naa Diaz

                                 Measurements

Intervals                              Axis          

Rate:         68                       P:            45

WV:           170                      QRS:          42

QRSD:         103                      T:            -83

QT:           385                                    

QTc:          410                                    

                           Interpretive Statements

Sinus rhythm

Left atrial enlargement

LVH with secondary repolarization abnormality

Inferolateral ST depression, consider acute ischemia

No previous ECG available for comparison

Electronically Signed On 2021 10:34:46 EDT by Naa Diaz

## 2021-07-09 NOTE — DISCHARGE SUMMARY
Providers





- Providers


Date of Admission: 


07/08/21 16:53





Date of discharge: 07/09/21


Attending physician: 


DEJAN MCNAMARA





                                        





07/08/21 16:52


Consult to Physician [CONS] Routine 


   Comment: 


   Consulting Provider: KELLY SEARS


   Physician Instructions: 


   Reason For Exam: chf











Primary care physician: 


PRIMARY CARE MD








Hospitalization


Condition: Fair


Pertinent studies: 





Chest x-ray, CTA chest


Hospital course: 


This patient is a 56-year-old female with a significant history of 

cardiomyopathy, heart failure reduced ejection fraction, palpitations, 

hypertension, hyperlipidemia, COPD emphysema, tobacco use, heart murmur presents

 to [facility] ER with chief complaint of shortness of 

breath, chest pain x3 days. Patient has previously been prescribed LifeVest 

approximately 5 years ago which she wore for 1 year before discontinuing AGAINST

 MEDICAL ADVICE.  Patient has not followed up with recommended electrophysiology

 to discuss ICD.  She had cardiac stress test 3 years prior which she reports as

 normal.  Tobacco use is reported with 2 packs/day x 40 years for estimated 80 

pack years, EtOH 2 drinks per night, no recreational drugs. Patient was 

evaluated in the ER,  initial cardiac enzyme and EKG was unremarkable, chest x-

ray showed diffuse interstitial infiltrates.  CTA chest showed no PE but 

prominent interstitial markings.  Patient was admitted and cardiology was 

consulted.  Her chest pain improved, cardiology was consulted and recommended 

outpatient ischemic work-up.  2D echocardiogram obtained which showed EF 30 to 

35%.  Patient was euvolemic and also noted to have 10 beats of V. tach.  Patient

 was initiated on aspirin Coreg lisinopril and statin.  Patient was encouraged 

and counseled this time to follow-up outpatient with cardiology to discuss about

 ICD.  Patient verbalized understanding and agreed to follow-up as outpatient.  

Patient was then discharged home in stable condition with outpatient follow-up. 

 Patient will also encourage to quit smoking and avoid alcohol drinking.  Her 

room air O2 saturation was greater than 94% and did not qualify for home O2 

arrangement.





Per Cardiology: Patient is in stable cardiac status.  We will plan for 

outpatient ischemic eval.  Patient may be discharged from cardiology standpoint 

on GDMT.  Will follow Patient should follow-up with Dr Thacker, [facility] 

heart specialist within 1 to 2 weeks of discharge (pending scheduling).  

#[id number]


This patient was seen in conjunction with Dr Thacker who agrees with this 

assessment plan of care





- Imaging and Cardiology


Echo: report reviewed (Echocardiogram (7/8/2021): LVEF is 30 to 35%.  LV mildly 

dilated.  LV SF moderately decreased.  Moderate global hypokinesis of LV.  Sev

ere diastolic dysfunction restrictive filling.  RV SF is normal.  LA is mildly 

dilated.  Mild MR.  Mild TR.)


EKG: report reviewed (Twelve-lead reviewed shows sinus rhythm with no acute 

ischemic changes), image reviewed








Disposition: DC/TX-06 HOME UNDER HOME HLTH


Final Discharge Diagnosis (Prints w/discharge instructions): (1) Hypertension.  

Current Visit: Yes   Status: Acute.  (2) Hyperlipidemia.  Current Visit: Yes   

Status: Acute.  (3) Cardiomyopathy.  Current Visit: Yes   Status: Acute.  (4) 

chronic systolic heart failure with reduced ejection fraction.  Current Visit: 

Yes   Status: chronic.  (5) Acute chest pain, likely from PNA.  Current Visit: 

Yes   Status: Acute.  (6) Alcohol dependence.  Current Visit: Yes   Status: 

Acute.  (7) COPD (chronic obstructive pulmonary disease).  Current Visit: Yes   

Status: Acute.  Qualifiers:  COPD type: chronic bronchitis.  (8) Chronic 

respiratory failure, now off oxygen.  Current Visit: Yes   Status: Chronic.  (9)

 bilateral PNA.  Current Visit: Yes   Status: Acute.  (10) Noncompliance.  

Current Visit: Yes   Status: Acute.  (11) Tobacco use disorder.  Current Visit: 

Yes   Status: chronic


Time spent for discharge: 34 minutes





Core Measure Documentation





- Palliative Care


Palliative Care/ Comfort Measures: Not Applicable





- Core Measures


Any of the following diagnoses?: none





Exam





- Physical Exam


Narrative exam: 





GENERAL:  well-developed -American female lying on bed appeared to be in 

no discomfort. 


HEENT: Normocephalic.  Atraumatic.  No conjunctival congestion or icterus. 

Patient has moist mucous membranes.


NECK: Supple.  Trachea midline.


CHEST/LUNGS: Clear to auscultated bilaterally, breathing nonlabored. No wheezes 

crackles or rhonchi.


HEART/CARDIOVASCULAR: Regular in rate and rhythm.  S1 and S2 positive.


ABDOMEN: Abdomen is soft, nontender.  Patient has normal bowel sounds.  


SKIN: There is no rash.  Warm and dry.


NEURO:  No focal motor deficit.  Follows command.


MUSCULOSKELETAL: No joint effusion or tenderness.


EXTRIMITY: No edema, no cyanosis or clubbing.


PSYCH:  Cooperative.











- Constitutional


Vitals: 


                                        











Temp Pulse Resp BP Pulse Ox


 


 97.9 F   88   17   129/76   99 


 


 07/09/21 12:45  07/09/21 12:45  07/09/21 12:45  07/09/21 12:45  07/09/21 12:45














Plan


Activity: advance as tolerated


Weight Bearing Status: Weight Bear as Tolerated


Diet: low fat, low salt


Special Instructions: restrict fluid intake to (1.5L daily), record daily 

weights


Durable Medical Equipment Needed Upon Discharge: Nebulizer


Follow up with: 


EMILY THACKER MD [Staff Physician] - 7 Days


PRIMARY CARE,MD [Primary Care Provider] - 3-5 Days


Prescriptions: 


Pravastatin Sodium [Pravastatin] 10 mg PO QHS #30 tablet


carvediloL [Coreg] 6.25 mg PO BID #60 tablet


Folic Acid [Folvite] 1 mg PO QDAY #30 tablet


Aspirin EC [Halfprin EC] 81 mg PO QDAY #30 tablet.


levoFLOXacin [Levaquin TAB] 750 mg PO Q24HR #5 tablet


Albuterol Mdi (or & Nicu Only) [ProAir HFA Inhaler] 2 puff IH QID PRN #8.5 gram


 PRN Reason: Shortness Of Breath


ALBUTEROL NEB's [Proventil 0.083% NEBS] 2.5 mg IH Q4HRT PRN #30 nebu


 PRN Reason: Shortness Of Breath


lisinopriL [Zestril TAB] 2.5 mg PO QDAY #30 tablet

## 2021-07-12 NOTE — ELECTROCARDIOGRAPH REPORT
[facility]

                                       

Test Date:    2021               Test Time:    07:52:11

Pat Name:     SHERIF HERNANDEZ             Department:   

Patient ID:   [id number]          Room:         A453 1

Gender:       F                        Technician:   GARY

:          1965               Requested By: OMAR ZAVALA

Order Number: [id number]              Reading MD:   Naa Diaz

                                 Measurements

Intervals                              Axis          

Rate:         79                       P:            73

IA:           161                      QRS:          53

QRSD:         112                      T:            -85

QT:           403                                    

QTc:          462                                    

                           Interpretive Statements

Sinus arrhythmia

Biatrial enlargement

LVH with secondary repolarization abnormality

Anterior ST elevation, probably due to LVH

Compared to ECG 2021 10:44:46

No significant change

Electronically Signed On 2021 17:41:04 EDT by Naa Diaz